# Patient Record
Sex: FEMALE | Race: BLACK OR AFRICAN AMERICAN | NOT HISPANIC OR LATINO | Employment: PART TIME | URBAN - METROPOLITAN AREA
[De-identification: names, ages, dates, MRNs, and addresses within clinical notes are randomized per-mention and may not be internally consistent; named-entity substitution may affect disease eponyms.]

---

## 2018-07-27 ENCOUNTER — HISTORICAL (OUTPATIENT)
Dept: ADMINISTRATIVE | Facility: HOSPITAL | Age: 28
End: 2018-07-27

## 2018-12-28 ENCOUNTER — HISTORICAL (OUTPATIENT)
Dept: ADMINISTRATIVE | Facility: HOSPITAL | Age: 28
End: 2018-12-28

## 2018-12-28 LAB
ABS NEUT (OLG): 7.95 X10(3)/MCL (ref 2.1–9.2)
BASOPHILS # BLD AUTO: 0 X10(3)/MCL (ref 0–0.2)
BASOPHILS NFR BLD AUTO: 0 %
EOSINOPHIL # BLD AUTO: 0.1 X10(3)/MCL (ref 0–0.9)
EOSINOPHIL NFR BLD AUTO: 1 %
ERYTHROCYTE [DISTWIDTH] IN BLOOD BY AUTOMATED COUNT: 14.3 % (ref 11.5–17)
GROUP & RH: NORMAL
HCT VFR BLD AUTO: 35.3 % (ref 37–47)
HGB BLD-MCNC: 11.1 GM/DL (ref 12–16)
LYMPHOCYTES # BLD AUTO: 2 X10(3)/MCL (ref 0.6–4.6)
LYMPHOCYTES NFR BLD AUTO: 18 %
MCH RBC QN AUTO: 27.1 PG (ref 27–31)
MCHC RBC AUTO-ENTMCNC: 31.4 GM/DL (ref 33–36)
MCV RBC AUTO: 86.1 FL (ref 80–94)
MONOCYTES # BLD AUTO: 0.9 X10(3)/MCL (ref 0.1–1.3)
MONOCYTES NFR BLD AUTO: 8 %
NEUTROPHILS # BLD AUTO: 7.95 X10(3)/MCL (ref 2.1–9.2)
NEUTROPHILS NFR BLD AUTO: 72 %
PLATELET # BLD AUTO: 253 X10(3)/MCL (ref 130–400)
PMV BLD AUTO: 9.8 FL (ref 9.4–12.4)
RBC # BLD AUTO: 4.1 X10(6)/MCL (ref 4.2–5.4)
T PALLIDUM AB SER QL: NORMAL
WBC # SPEC AUTO: 11.1 X10(3)/MCL (ref 4.5–11.5)

## 2022-04-30 NOTE — ED PROVIDER NOTES
Patient:   Adriana Hernandez            MRN: [id number]            FIN: [id number]               Age:   28 years     Sex:  Female     :  1990   Associated Diagnoses:   Abdominal pain in pregnancy   Author:   Zuleyma THOMASON, Jose MONTGOMERY      Basic Information   Time seen: Date & time 2018 19:17:00.   History source: Patient.   Arrival mode: Private vehicle.   History limitation: None.   Additional information: Patient's physician(s): Meme THOMASON, FELY Contreras have assummed care of this patient at  2200         . [facility]MD.      History of Present Illness   The patient presents with Abdominal pain without NVD, vaginal bleeding.  at 17 weeks gestation. She also reports having blurred vision today. Caitlyn NP    29 y/o AA female who is currently 17 weeks pregnant with hx of preeclampsia with second preg presents to the ED c/o bilateral abdominal pain that  began a few days ago and is intermittent mild tightness on both sides of the abdomen. Pt states that the pain worsened today, however she denies taking any medicine for the pain. Pt reports has some  headaches and denies dysuria. Pt states experiencing some bleeding when she was approximately 7 weeks pregnant however, she has not experienced any other complications with this pregnancy. .  The onset was Pt states pain began a few days ago.  The course/duration of symptoms is fluctuating in intensity.  The location is bilateral, Both sides of abdomen and Approximately level of the umbilicus along both sides.  The character of symptoms is achy and Tight.  The degree at onset was moderate.  The degree at present is moderate.  Radiating pain: none. Pregnancy Status : 4, Para: 3, 17 weeks Currently pregnant.  The exacerbating factor is none.  The relieving factor is none.  Risk factors consist of preeclampsia (prior).  Prior episodes: none.  Therapy today: none.  Pregnancy symptoms vaginal bleeding (at approximately 7 weeks).  Associated symptoms:  denies nausea, denies vomiting, denies fever, denies chills, denies dysuria, denies diarrhea, denies constipation, denies back pain and denies chest pain.        Review of Systems   Constitutional symptoms:  Negative except as documented in HPI.   Skin symptoms:  Negative except as documented in HPI.   Eye symptoms:  Negative except as documented in HPI.   ENMT symptoms:  Negative except as documented in HPI.   Respiratory symptoms:  Negative except as documented in HPI.   Cardiovascular symptoms:  Negative except as documented in HPI.   Gastrointestinal symptoms:  Abdominal pain, no nausea, no vomiting, no rectal bleeding.    Genitourinary symptoms:  Minimum bleeding at 7 weeks none since, no dysuria, no vaginal bleeding.    Musculoskeletal symptoms:  Negative except as documented in HPI.   Neurologic symptoms:  Headache.   Psychiatric symptoms:  Negative except as documented in HPI.   Endocrine symptoms:  Negative except as documented in HPI.   Hematologic/Lymphatic symptoms:  Negative except as documented in HPI.   Allergy/immunologic symptoms:  Negative except as documented in HPI.             Additional review of systems information: All other systems reviewed and otherwise negative.      Health Status   Allergies:    Allergic Reactions (Selected)  Severity Not Documented  Penicillins- No reactions were documented.  Sulfa drugs- No reactions were documented..   Medications: Prenatal vitamins.   Immunizations: Influenza, no tetanus up to date, no pneumococcal.   Pregnancy history: Currently pregnant, 17 weeks,  4, para 3.      Past Medical/ Family/ Social History   Medical history: preeclampsia, With 2nd pregnancy.   Surgical history: EGD.   Family history:    Asthma  Sister  Hypertension  Mother  .   Social history: Alcohol use: Denies, Tobacco use: Denies, Drug use: Denies, Occupation: Employed, Family/social situation: , intact family.      Physical Examination               Vital Signs       Vital Signs (last 24 hrs)_____  Last Charted___________  Temp Oral     37 DegC  (JUL 27 19:17)  Heart Rate Peripheral   77 bpm  (JUL 27 22:10)  Resp Rate         14 br/min  (JUL 27 22:10)  SBP      134 mmHg  (JUL 27 22:10)  DBP      79 mmHg  (JUL 27 22:10)  SpO2      100 %  (JUL 27 22:10)  .   Oxygen saturation.   General:  Alert, no acute distress, not anxious, not ill-appearing.    Skin:  Warm, dry, no pallor, no rash, normal for ethnicity.    Head:  Normocephalic, atraumatic.    Neck:  Supple, trachea midline, no tenderness, no carotid bruit.    Eye:  Pupils are equal, round and reactive to light, extraocular movements are intact, normal conjunctiva.    Ears, nose, mouth and throat:  Tympanic membranes clear, oral mucosa moist, no pharyngeal erythema or exudate.    Cardiovascular:  Regular rate and rhythm, No murmur, Normal peripheral perfusion, No edema.    Respiratory:  Lungs are clear to auscultation, respirations are non-labored, breath sounds are equal.    Chest wall:  No tenderness, No deformity.    Back:  Nontender, Normal range of motion, Normal alignment, no step-offs.    Musculoskeletal:  Normal ROM, normal strength, no tenderness, no swelling, no deformity.    Gastrointestinal:  Soft, Nontender, Non distended, Normal bowel sounds, No organomegaly.    Genitourinary:  no CVA tenderness.   Neurological:  Alert and oriented to person, place, time, and situation, No focal neurological deficit observed, CN II-XII intact, normal sensory observed, normal motor observed, normal speech observed, normal coordination observed, normal and symmetrical reflexes.    Lymphatics:  No lymphadenopathy.   Psychiatric:  Cooperative, appropriate mood & affect, normal judgment, non-suicidal.       Medical Decision Making   Differential Diagnosis:  Abdominal pain, discomfort of pregnancy.    Documents reviewed:  Emergency department nurses' notes.   Orders  Launch Orders   Laboratory:  CMP (Order): Stat collect, 7/27/2018  19:18 CDT, Blood, Lab Collect, Print Label By Order Location, 7/27/2018 19:18 CDT  CBC w/ Auto Diff (Order): Now collect, 7/27/2018 19:18 CDT, Blood, Lab Collect, Print Label By Order Location, 7/27/2018 19:18 CDT  Urinalysis Complete a reflex to culture (Order): Stat collect, Urine, 7/27/2018 19:18 CDT, Nurse collect, Print Label By Order Location  Patient Care:  obtain fetal heart tones (Order): 7/27/2018 19:18 CDT, obtain fetal heart tones.   Results review:  Lab results : Lab View   7/27/2018 19:27 CDT      Sodium Lvl                139 mmol/L                             Potassium Lvl             3.3 mmol/L  LOW                             Chloride                  108 mmol/L  HI                             CO2                       26.0 mmol/L                             Calcium Lvl               9.0 mg/dL                             Glucose Lvl               99 mg/dL                             BUN                       7.0 mg/dL                             Creatinine                0.64 mg/dL                             eGFR-AA                   >60 mL/min/1.73 m2  NA                             eGFR-PARMINDER                  >60 mL/min/1.73 m2  NA                             Bili Total                <0.1 mg/dL  LOW                             Bili Direct               0.00 mg/dL                             Bili Indirect             <0.10 mg/dL                             AST                       17 unit/L                             ALT                       30 unit/L                             Alk Phos                  85 unit/L                             Total Protein             7.1 gm/dL                             Albumin Lvl               3.30 gm/dL  LOW                             Globulin                  3.80 gm/dL  HI                             A/G Ratio                 0.9 ratio  LOW                             WBC                       10.0 x10(3)/mcL                             RBC                        3.80 x10(6)/mcL  LOW                             Hgb                       10.9 gm/dL  LOW                             Hct                       33.2 %  LOW                             Platelet                  239 x10(3)/mcL                             MCV                       87.4 fL                             MCH                       28.7 pg                             MCHC                      32.8 gm/dL  LOW                             RDW                       12.8 %                             MPV                       9.9 fL                             Abs Neut                  7.52 x10(3)/mcL                             Neutro Auto               75 %  NA                             Lymph Auto                18 %  NA                             Mono Auto                 6 %  NA                             Eos Auto                  1 %  NA                             Abs Eos                   0.1 x10(3)/mcL                             Basophil Auto             0 %  NA                             Abs Neutro                7.52 x10(3)/mcL                             Abs Lymph                 1.8 x10(3)/mcL                             Abs Mono                  0.6 x10(3)/mcL                             Abs Baso                  0.0 x10(3)/mcL    7/27/2018 19:26 CDT      UA Appear                 CLEAR                             UA Color                  YELLOW                             UA Spec Grav              1.032                             UA Bili                   Negative                             UA pH                     6.0                             UA Urobilinogen           1.0                             UA Blood                  2+                             UA Glucose                Negative                             UA Ketones                Negative                             UA Protein                Negative                             UA Nitrite                Negative                              UA Leuk Est               Trace                             UA WBC                    NONE SEEN                             UA RBC                    20 /HPF  HI                             UA Bacteria               NONE SEEN /HPF                             UA Squam Epithelial       NONE SEEN  .   Radiology results:  Ultrasound, Unremarkable ultrasound this point.  Heart rate 147 cervix 7.29 cm.  Vertex presentation fundal placenta estimated date of delivery1/2/ 2019.       Reexamination/ Reevaluation   Time: 7/27/2018 22:15:00 .   Assessment: Normal exam nontender abdomen bilateral pain consistent with round ligament her discomfort a pregnancy stable doing well will be discharged home take Tylenol.      Impression and Plan   Diagnosis   Abdominal pain in pregnancy ([id number] O26.899)      Calls-Consults   -  7/27/2018 22:16:00 .   Plan   Condition: Unchanged.    Disposition: Discharged: Time  7/27/2018 22:16:00, to home.    Patient was given the following educational materials: Abdominal Pain During Pregnancy, Easy-to-Read.    Follow up with: Srinivasa Valentine Call for followup appointment please follow-up with your ObGyn doctor this next week  He may take Tylenol for any discomfort  Return for any emergency problem.    Counseled: Patient, Regarding diagnosis, Regarding diagnostic results, Regarding treatment plan, Patient understood.    Notes: I, Taylor Jeansonne, acted solely as a scribe for and in the presence of Dr. Amor who performed the service., I, Jose Amor MD, a physician licensed to practice in this state, have  performed the physical evaluation,  history gathering,  and medical decision making that is reflected in this record..   RUEL Amor MD.

## 2023-03-01 ENCOUNTER — HOSPITAL ENCOUNTER (OUTPATIENT)
Dept: RADIOLOGY | Facility: HOSPITAL | Age: 33
Discharge: HOME | End: 2023-03-01
Attending: NURSE PRACTITIONER
Payer: COMMERCIAL

## 2023-03-01 DIAGNOSIS — J45.30 MILD PERSISTENT ASTHMA: Primary | ICD-10-CM

## 2023-03-01 DIAGNOSIS — J45.30 MILD PERSISTENT ASTHMA: ICD-10-CM

## 2023-03-01 PROCEDURE — 71046 X-RAY EXAM CHEST 2 VIEWS: CPT | Mod: TC

## 2023-04-11 ENCOUNTER — HOSPITAL ENCOUNTER (OUTPATIENT)
Dept: RADIOLOGY | Facility: HOSPITAL | Age: 33
Discharge: HOME | End: 2023-04-11
Attending: NURSE PRACTITIONER
Payer: MEDICAID

## 2023-04-11 DIAGNOSIS — Z15.01 GENETIC PREDISPOSITION TO BREAST CANCER: Primary | ICD-10-CM

## 2023-04-11 DIAGNOSIS — N63.11 UNSPECIFIED LUMP IN THE RIGHT BREAST, UPPER OUTER QUADRANT: ICD-10-CM

## 2023-04-11 PROCEDURE — 76642 US BREAST RIGHT LIMITED: ICD-10-PCS | Mod: 26,RT,, | Performed by: RADIOLOGY

## 2023-04-11 PROCEDURE — 76642 ULTRASOUND BREAST LIMITED: CPT | Mod: TC,RT

## 2023-04-11 PROCEDURE — 77066 DX MAMMO INCL CAD BI: CPT | Mod: TC

## 2023-04-11 PROCEDURE — 77062 MAMMO DIGITAL DIAGNOSTIC BILAT WITH TOMO: ICD-10-PCS | Mod: 26,,, | Performed by: RADIOLOGY

## 2023-04-11 PROCEDURE — 77062 BREAST TOMOSYNTHESIS BI: CPT | Mod: 26,,, | Performed by: RADIOLOGY

## 2023-04-11 PROCEDURE — 77066 DX MAMMO INCL CAD BI: CPT | Mod: 26,,, | Performed by: RADIOLOGY

## 2023-04-11 PROCEDURE — 77066 MAMMO DIGITAL DIAGNOSTIC BILAT WITH TOMO: ICD-10-PCS | Mod: 26,,, | Performed by: RADIOLOGY

## 2023-04-11 PROCEDURE — 76642 ULTRASOUND BREAST LIMITED: CPT | Mod: 26,RT,, | Performed by: RADIOLOGY

## 2023-10-24 NOTE — PROGRESS NOTES
[facility] Breast Surg  Breast Surgical Oncology  New Patient Office Visit - H&P      Care Team: Patient Care Team:  Yasmin Coreas MD as PCP - General (Family Medicine)   Referring Provider: No ref. provider found    OBGYN: Mine Perez NP    Chief Complaint:   Chief Complaint   Patient presents with    Breast Cancer Screening     New patient is present for High Risk visit.         Subjective:      History of Present Illness:  Adriana Hernandez is a pleasant 33 y.o.female who presents as a referral from Mine Perez NP for high risk evaluation.  Family history is significant for mom with breast cancer at 42, maternal aunt with breast cancer in her 40s, maternal grandmother with breast cancer in her 40s who  of metastatic disease, and maternal grandfather with metastatic prostate cancer.  She denies any breast changes including new masses, skin changes, skin lesions, pain, nipple discharge or retraction.  She had a bilateral diagnostic mammogram in April for a physician palpated mass in her right breast.  There were no suspicious abnormalities seen.  She has never had any breast surgeries or biopsies in the past.  She does not smoke. 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 She has four children, 3 boys and 1 girl, and is interested in more children.      Imaging:   Bilateral diagnostic mammogram and ultrasound 2023:  Density D. no suspicious findings.  And no abnormalities evident in general area of palpable concern in the R upper-outer quadrant.  BI-RADS 2.    I have reviewed the imaging and agree with the radiologists interpretation. I have discussed these results with the patient.    Pathology:  None      OB / GYN History   Menarche Onset:9  Menopause: Menopause: premenopausal  Hormonal birth control (duration): 2years  Pregnancies: 4  Age at first child birth: 15  Child births: 4  Hysterectomy/Oophorectomy: no  HRT: no    Family History of  Cancer (& age at diagnosis):  -   Family History   Problem Relation Age of Onset    Breast cancer Mother 42    Diabetes Mother     Hypertension Mother     Breast cancer Maternal Grandmother 40 - 49    Hypertension Maternal Grandmother     Prostate cancer Maternal Grandfather 50 - 59    Breast cancer Maternal Aunt 42    Hypertension Maternal Aunt     Diabetes Maternal Cousin         Lifestyle:  Height and Weight:   BMI: Body mass index is 23.46 kg/m².     Other:  MG breast density: D  Prior thoracic RT: none  Genetic testing: No  Ashkenazi Buddhism descent: No    Other History:     History reviewed. No pertinent past medical history.     History reviewed. No pertinent surgical history.     Social History     Socioeconomic History    Marital status:    Tobacco Use    Smoking status: Never    Smokeless tobacco: Never   Substance and Sexual Activity    Alcohol use: Not Currently     Comment: occasionally    Drug use: Never    Sexual activity: Yes     Partners: Male          There is no immunization history on file for this patient.     Medications/Allergies:       Current Outpatient Medications   Medication Instructions    albuterol (PROVENTIL/VENTOLIN HFA) 90 mcg/actuation inhaler As needed (PRN)    fluticasone propionate (FLOVENT HFA) 220 mcg/actuation inhaler As needed (PRN)    levocetirizine (XYZAL) 5 MG tablet 1 tablet in the evening Orally Once a day    loratadine (CLARITIN) 10 mg tablet As needed (PRN)    montelukast (SINGULAIR) 10 mg tablet As needed (PRN)    multivit-iron-FA-calcium-mins (WOMEN'S DAILY FORMULA) 18 mg iron-400 mcg-500 mg Ca Tab Womens Daily Formula    omeprazole (PRILOSEC) 40 MG capsule 1 capsule 30 minutes before morning meal Orally Once a day       Review of patient's allergies indicates:   Allergen Reactions    Penicillins Hives    Sulfa (sulfonamide antibiotics) Hives        Review of Systems:      ROS    Constitutional: denies fevers, chills, weight loss  HEENT: denies blurry/double  "vision, changes in hearing, odynophagia, dysphagia  Respiratory: denies cough, shortness of breath  Cardiovascular: denies palpitations, swelling of the extremities  GI: denies abdominal pain, nausea/vomiting, hematochezia, frequent stools  : denies frequency, dysuria, flank pain, hematuria  Skin: denies new rashes  Neurological: denies muscular/sensory deficiencies, loss of coordination, memory changes, positive migraines  Endo: denies hair loss/thinning, nervousness, hot flashes, heat/cold intolerance, lumps in the neck area  Heme: denies easy bruising and fatigue  Psychological: denies anxious/depressive moods  Musculoskeletal: denies bony pain, muscle cramps, swollen joints       Objective/Physical Exam   Visit Vitals  /71   Pulse 82   Temp 98.4 °F (36.9 °C) (Oral)   Resp 18   Ht 5' 5" (1.651 m)   Wt 64 kg (141 lb)   LMP 09/17/2023   SpO2 99%   BMI 23.46 kg/m²        Physical Exam    General: The patient is awake, alert and oriented. The patient is well nourished. No acute distress.  Neck: The neck is supple. The thyroid is not enlarged.  Musculoskeletal: The patient has a normal range of motion of her bilateral upper extremities.  Heart: Regular rate and rhythm, no murmurs.   Lung: No increased work of breathing. Clear breath sounds bilaterally.  Lymph nodes: There is no axillary, supraclavicular or cervical lymphadenopathy.  Breast:  Right:   On inspection there is no skin dimpling, rashes, retraction, erythema or skin abnormalities. The nipple areolar complex is normal with an everted nipple. The breasts move normally with movement of the pectoralis muscle. On palpation there are no dominant masses. There is no tenderness. There is no nipple discharge.  Left:   On inspection there is no skin dimpling, rashes, retraction, erythema or skin abnormalities. The nipple areolar complex is normal with an everted nipple. The breasts move normally with movement of the pectoralis muscle. On palpation there are " no dominant masses.  There is no tenderness. There is no nipple discharge.     Assessment and Plan     1. Visit for screening mammogram  - Mammo Digital Screening Bilat w/ Moe; Future    2. Screening for breast cancer using non-mammogram modality  - MRI Breast w/wo Contrast, w/CAD, Bilateral; Future    3. At high risk for breast cancer  - MRI Breast w/wo Contrast, w/CAD, Bilateral; Future    Adriana Hernandez is a pleasant 33 y.o.female who presents today for high risk evaluation.  No suspicious findings on breast exam today.  Today she was plugged into the [employer] risk calculator and her calculated risk of breast cancer based on Tyrer - Cuzick Breast Cancer Risk Model  was 35%.  [place] 5 year risk is 0.5%.  She understands that >20% is considered high risk.  Today we discussed risk factors associated with the development of breast cancer and risk reduction strategies.      After the initial clinical evaluation nearly 30 minutes were on counseling the patients regarding the options for management. Risk reduction strategies were discussed.     1. Lifestyle factors: As with other types of cancer, studies continue to show that various lifestyle factors may contribute to the development of breast cancer.     Weight: Recent studies have shown that postmenopausal women who are overweight or obese have an increased risk of breast cancer. These women also have a higher risk of having the cancer come back after treatment.     Physical activity: Decreased physical activity is associated with an increased risk of developing breast cancer and a higher risk of having the cancer come back after treatment. Regular physical activity may protect against breast cancer by helping women maintain a healthy body weight, lowering hormone levels, or causing changes in a womens metabolism or immune factors.     Alcohol: Current research suggests that having more than 1 to 2 alcoholic drinks, including beer, wine, and  "spirits, per day raises the risk of breast cancer, as well as the risk of having the cancer come back after treatment.     Food: There is no reliable research that confirms that eating or avoiding specific foods reduces the risk of developing breast cancer or having the cancer come back after treatment. However, eating more fruits and vegetables and fewer animal fats is linked with many health benefits.     2. Prevention:  Surgery to lower cancer risk: For women with BRCA1 or BRCA2 genetic mutations, which substantially increase the risk of breast cancer, preventive removal of the breasts may be considered. The procedure, called a prophylactic mastectomy, appears to reduce the risk of developing breast cancer by at least 95%. Women with these mutations should also consider the preventive removal of the ovaries and fallopian tubes, called a prophylactic salpingo-oophorectomy. This procedure can reduce the risk of developing ovarian cancer, as well as breast cancer, by stopping the ovaries from making estrogen.      Drugs to lower cancer risk (Chemoprevention): Women who have a higher than usual risk of developing breast cancer may consider certain drugs that may help prevent breast cancer. This approach may also be called "chemoprevention." For breast cancer, this is the use of hormone-blocking drugs to reduce cancer risk. The drugs, tamoxifen (Soltamox) and raloxifene (Evista), are approved by the U.S. Food and Drug Administration (FDA) to lower breast cancer risk. These drugs are called selective estrogen receptor modulators (SERMs) and are not chemotherapy. A SERM is a medication that blocks estrogen receptors in some tissues and not others. Both women who have gone through menopause and those who have not may take tamoxifen. Raloxifene is only approved for women who have gone through menopause. Each drug also has different side effects.     Aromatase inhibitors (AIs) have also been shown to lower breast cancer " risk. AIs are a type of hormone-blocking treatment that reduces the amount of estrogen in a woman's body by stopping tissues and organs other than the ovaries from producing estrogen. They can only be used by women who have gone through menopause. However, no AIs have been approved by the FDA for lowering breast cancer risk in women who do not have the disease.     3. Surveillance: Women with a known genetic mutation should follow screening guidelines per the NCCN guidelines. Women with no known genetic mutation  and found to be at greater than 20 percent average lifetime risk of breast cancer are recommended for the following screening recommendations:     Clinical Breast exam: Every 6-12 months starting at age found to be at increased risk by risk model     Mammogram: Per NCCN guidelines, recommended every year starting 10 years younger than the youngest breast cancer case in the family (but not before age 30). May consider beginning breast MRIs at age 30 per ACR guidelines if desired by patient or other clinical considerations. May also consider getting a baseline MG at time of initial high risk consultation.     Breast MRI: Per NCCN guidelines, recommended every year starting 10 years younger than the youngest breast cancer case in the family (but not before age 25). May consider beginning breast MRIs at age 30 per ACR guidelines if desired by patient or other clinical considerations. May also consider getting a baseline MG at time of initial high risk consultation. If patient has a first-degree relative with a BRCA1/2 gene mutation, youre encouraged to get genetic counseling and/or testing before getting MRI as part of screening (for those who do not wish to have genetic testing, MRI is recommended). Breast MRI in combination with mammography is better than mammography alone at finding breast cancer in certain women at higher than average risk.      --------------------------------------------------------------------------------------------------------------    PLAN:    1. Lifestyle - Healthy lifestyle guidelines were reviewed. She was encouraged to engage in regular exercise, maintain a healthy body weight, and avoid excessive alcohol consumption. Healthy nutritional guidelines were also discussed.     2. Surveillance - She desires undergoing high risk screening with annual screening mammograms and breast MRIs. In the absence of significant clinical findings in the interval, I recommend MRI now and bilateral screening mammogram due in April 2024.    3. Prevention - We had a brief discussion/education about indications for preventative mastectomy or chemoprevention.  These methods are not recommended to her at this time.    4. Genetics - According to NCCN guidelines,  she meets criteria for genetic testing.  We discussed testing and will proceed with lab draw in the office today.    5. Recommend 2 clinical breast exams a year and this can alternate between the breast Center and her OBGYN.    We also recommend and discussed performing a self breast exam monthly.  If she notices any changes in her breast exam in between visits she was encouraged to call the breast center to be seen as soon as possible. All questions were answered.         Sheryl Shelton MD  Breast Surgical Oncology       I spent a total of 45 minutes on the day of the visit.  This includes face to face time and non-face to face time preparing to see the patient (eg, review of tests), obtaining and/or reviewing separately obtained history, documenting clinical information in the electronic or other health record, independently interpreting results and communicating results to the patient/family/caregiver, or care coordinator.

## 2023-10-25 ENCOUNTER — OFFICE VISIT (OUTPATIENT)
Dept: SURGERY | Facility: CLINIC | Age: 33
End: 2023-10-25
Payer: MEDICAID

## 2023-10-25 VITALS
TEMPERATURE: 98 F | HEIGHT: 65 IN | HEART RATE: 82 BPM | RESPIRATION RATE: 18 BRPM | DIASTOLIC BLOOD PRESSURE: 71 MMHG | WEIGHT: 141 LBS | SYSTOLIC BLOOD PRESSURE: 116 MMHG | BODY MASS INDEX: 23.49 KG/M2 | OXYGEN SATURATION: 99 %

## 2023-10-25 DIAGNOSIS — Z12.39 SCREENING FOR BREAST CANCER USING NON-MAMMOGRAM MODALITY: ICD-10-CM

## 2023-10-25 DIAGNOSIS — Z91.89 AT HIGH RISK FOR BREAST CANCER: ICD-10-CM

## 2023-10-25 DIAGNOSIS — Z12.31 VISIT FOR SCREENING MAMMOGRAM: Primary | ICD-10-CM

## 2023-10-25 PROCEDURE — 3078F DIAST BP <80 MM HG: CPT | Mod: CPTII,,, | Performed by: STUDENT IN AN ORGANIZED HEALTH CARE EDUCATION/TRAINING PROGRAM

## 2023-10-25 PROCEDURE — 99999 PR PBB SHADOW E&M-EST. PATIENT-LVL IV: ICD-10-PCS | Mod: PBBFAC,,, | Performed by: STUDENT IN AN ORGANIZED HEALTH CARE EDUCATION/TRAINING PROGRAM

## 2023-10-25 PROCEDURE — 99204 PR OFFICE/OUTPT VISIT, NEW, LEVL IV, 45-59 MIN: ICD-10-PCS | Mod: S$PBB,,, | Performed by: STUDENT IN AN ORGANIZED HEALTH CARE EDUCATION/TRAINING PROGRAM

## 2023-10-25 PROCEDURE — 3078F PR MOST RECENT DIASTOLIC BLOOD PRESSURE < 80 MM HG: ICD-10-PCS | Mod: CPTII,,, | Performed by: STUDENT IN AN ORGANIZED HEALTH CARE EDUCATION/TRAINING PROGRAM

## 2023-10-25 PROCEDURE — 3008F PR BODY MASS INDEX (BMI) DOCUMENTED: ICD-10-PCS | Mod: CPTII,,, | Performed by: STUDENT IN AN ORGANIZED HEALTH CARE EDUCATION/TRAINING PROGRAM

## 2023-10-25 PROCEDURE — 3074F PR MOST RECENT SYSTOLIC BLOOD PRESSURE < 130 MM HG: ICD-10-PCS | Mod: CPTII,,, | Performed by: STUDENT IN AN ORGANIZED HEALTH CARE EDUCATION/TRAINING PROGRAM

## 2023-10-25 PROCEDURE — 99214 OFFICE O/P EST MOD 30 MIN: CPT | Mod: PBBFAC | Performed by: STUDENT IN AN ORGANIZED HEALTH CARE EDUCATION/TRAINING PROGRAM

## 2023-10-25 PROCEDURE — 99999 PR PBB SHADOW E&M-EST. PATIENT-LVL IV: CPT | Mod: PBBFAC,,, | Performed by: STUDENT IN AN ORGANIZED HEALTH CARE EDUCATION/TRAINING PROGRAM

## 2023-10-25 PROCEDURE — 1159F PR MEDICATION LIST DOCUMENTED IN MEDICAL RECORD: ICD-10-PCS | Mod: CPTII,,, | Performed by: STUDENT IN AN ORGANIZED HEALTH CARE EDUCATION/TRAINING PROGRAM

## 2023-10-25 PROCEDURE — 3074F SYST BP LT 130 MM HG: CPT | Mod: CPTII,,, | Performed by: STUDENT IN AN ORGANIZED HEALTH CARE EDUCATION/TRAINING PROGRAM

## 2023-10-25 PROCEDURE — 3008F BODY MASS INDEX DOCD: CPT | Mod: CPTII,,, | Performed by: STUDENT IN AN ORGANIZED HEALTH CARE EDUCATION/TRAINING PROGRAM

## 2023-10-25 PROCEDURE — 1159F MED LIST DOCD IN RCRD: CPT | Mod: CPTII,,, | Performed by: STUDENT IN AN ORGANIZED HEALTH CARE EDUCATION/TRAINING PROGRAM

## 2023-10-25 PROCEDURE — 1160F RVW MEDS BY RX/DR IN RCRD: CPT | Mod: CPTII,,, | Performed by: STUDENT IN AN ORGANIZED HEALTH CARE EDUCATION/TRAINING PROGRAM

## 2023-10-25 PROCEDURE — 99204 OFFICE O/P NEW MOD 45 MIN: CPT | Mod: S$PBB,,, | Performed by: STUDENT IN AN ORGANIZED HEALTH CARE EDUCATION/TRAINING PROGRAM

## 2023-10-25 PROCEDURE — 1160F PR REVIEW ALL MEDS BY PRESCRIBER/CLIN PHARMACIST DOCUMENTED: ICD-10-PCS | Mod: CPTII,,, | Performed by: STUDENT IN AN ORGANIZED HEALTH CARE EDUCATION/TRAINING PROGRAM

## 2023-11-07 ENCOUNTER — TELEPHONE (OUTPATIENT)
Dept: SURGERY | Facility: CLINIC | Age: 33
End: 2023-11-07
Payer: MEDICAID

## 2023-11-07 NOTE — TELEPHONE ENCOUNTER
[facility] genetic rest results called to patient.  Explained that results negative for a genetic mutation with one VUS. Explained to patient that genetic counseling is available through [facility] if she desires. Patient verbalized understanding. Results scanned to chart and printed for patient. Patient will come to the office to  results at her convenience.

## 2024-03-23 ENCOUNTER — HOSPITAL ENCOUNTER (EMERGENCY)
Facility: HOSPITAL | Age: 34
Discharge: HOME | End: 2024-03-23
Attending: EMERGENCY MEDICINE
Payer: MEDICAID

## 2024-03-23 VITALS
TEMPERATURE: 98 F | HEART RATE: 92 BPM | RESPIRATION RATE: 18 BRPM | DIASTOLIC BLOOD PRESSURE: 70 MMHG | OXYGEN SATURATION: 100 % | SYSTOLIC BLOOD PRESSURE: 130 MMHG

## 2024-03-23 DIAGNOSIS — N75.1 BARTHOLIN'S GLAND ABSCESS: Primary | ICD-10-CM

## 2024-03-23 PROCEDURE — 99284 EMERGENCY DEPT VISIT MOD MDM: CPT | Mod: 25

## 2024-03-23 PROCEDURE — 56420 I&D BARTHOLINS GLAND ABSCESS: CPT

## 2024-03-23 PROCEDURE — 25000003 PHARM REV CODE 250

## 2024-03-23 RX ORDER — IBUPROFEN 600 MG/1
600 TABLET ORAL EVERY 6 HOURS PRN
Qty: 20 TABLET | Refills: 0 | Status: SHIPPED | OUTPATIENT
Start: 2024-03-23

## 2024-03-23 RX ORDER — CLINDAMYCIN HYDROCHLORIDE 300 MG/1
300 CAPSULE ORAL EVERY 8 HOURS
Qty: 21 CAPSULE | Refills: 0 | Status: SHIPPED | OUTPATIENT
Start: 2024-03-23 | End: 2024-03-30

## 2024-03-23 RX ORDER — ONDANSETRON 4 MG/1
4 TABLET, ORALLY DISINTEGRATING ORAL
Status: COMPLETED | OUTPATIENT
Start: 2024-03-23 | End: 2024-03-23

## 2024-03-23 RX ORDER — LIDOCAINE HYDROCHLORIDE 10 MG/ML
5 INJECTION INFILTRATION; PERINEURAL
Status: COMPLETED | OUTPATIENT
Start: 2024-03-23 | End: 2024-03-23

## 2024-03-23 RX ORDER — HYDROCODONE BITARTRATE AND ACETAMINOPHEN 5; 325 MG/1; MG/1
1 TABLET ORAL EVERY 6 HOURS PRN
Qty: 8 TABLET | Refills: 0 | Status: SHIPPED | OUTPATIENT
Start: 2024-03-23

## 2024-03-23 RX ORDER — ONDANSETRON 4 MG/1
4 TABLET, ORALLY DISINTEGRATING ORAL
Status: DISCONTINUED | OUTPATIENT
Start: 2024-03-23 | End: 2024-03-23

## 2024-03-23 RX ORDER — HYDROCODONE BITARTRATE AND ACETAMINOPHEN 7.5; 325 MG/1; MG/1
1 TABLET ORAL
Status: COMPLETED | OUTPATIENT
Start: 2024-03-23 | End: 2024-03-23

## 2024-03-23 RX ADMIN — HYDROCODONE BITARTRATE AND ACETAMINOPHEN 1 TABLET: 7.5; 325 TABLET ORAL at 05:03

## 2024-03-23 RX ADMIN — ONDANSETRON 4 MG: 4 TABLET, ORALLY DISINTEGRATING ORAL at 05:03

## 2024-03-23 RX ADMIN — LIDOCAINE HYDROCHLORIDE 5 ML: 10 INJECTION, SOLUTION INFILTRATION; PERINEURAL at 05:03

## 2024-03-23 NOTE — FIRST PROVIDER EVALUATION
Medical screening examination initiated.  I have conducted a focused provider triage encounter, findings are as follows:    Brief history of present illness:  33y/o F presents to the ED with concerns of having a abscess in her vagina. Onset 1 week.     There were no vitals filed for this visit.    Pertinent physical exam:  AAA x 3    Brief workup plan:  MD evaluation.     Preliminary workup initiated; this workup will be continued and followed by the physician or advanced practice provider that is assigned to the patient when roomed.

## 2024-03-23 NOTE — DISCHARGE INSTRUCTIONS
Thanks for letting us take care of you today!  It is our goal to give you courteous care and to keep you comfortable and informed, if you have any questions before you leave I will be happy to try and answer them.    Here is some advice after your visit:      Your visit in the emergency department is NOT definitive care - please follow-up with your primary care doctor and/or specialist within 1 week.  Please return if you have any worsening in your condition or if you have any other concerns.    If you were prescribed OPIATE PAIN MEDICATION - please understand of these medications can be addictive, you may fill less of the prescription was written for, you do not have to take the full prescription.  You may discard what you do not use.  Please seek help if you feel you are having problems with addiction.  Do not drive or operate heavy machinery if you are taking sedating medications.  Do not mix these medications with alcohol.      Please take the full course of  any ANTIBIOTICS you were prescribed - incomplete courses of antibiotics can cause resistance to antibiotics in the future which will make it difficult to treat any infections you may have.

## 2024-03-23 NOTE — ED PROVIDER NOTES
Encounter Date: 3/23/2024       History     Chief Complaint   Patient presents with    Abscess     Pt. C/o vaginal abscess started x1 week ago.. denies drainage or fever     34 y.o.  female presents to Emergency Department with a chief complaint of vaginal abscess. Symptoms began 1 week ago after shaving and have been constant since onset. Associated symptoms include redness, swelling, and tenderness to vagina. Symptoms are aggravated with palpation and there are no alleviating factors. The patient denies CP, SOB, fever, chills, dizziness, or vomiting. No other reported symptoms at this time      The history is provided by the patient. No  was used.   Abscess   This is a new problem. The current episode started several days ago. The problem occurs continuously. The problem has been unchanged. The abscess is present on the groin. The abscess is characterized by redness, painfulness and swelling. Pertinent negatives include no anorexia, no decrease in physical activity, not sleeping less, no fever, no diarrhea, no vomiting, no congestion, no rhinorrhea, no decreased responsiveness and no cough. She has received no recent medical care.     Review of patient's allergies indicates:   Allergen Reactions    Penicillins Hives    Sulfa (sulfonamide antibiotics) Hives     History reviewed. No pertinent past medical history.  History reviewed. No pertinent surgical history.  Family History   Problem Relation Age of Onset    Breast cancer Mother 42    Diabetes Mother     Hypertension Mother     Breast cancer Maternal Grandmother 40 - 49    Hypertension Maternal Grandmother     Prostate cancer Maternal Grandfather 50 - 59    Breast cancer Maternal Aunt 42    Hypertension Maternal Aunt     Diabetes Maternal Cousin      Social History     Tobacco Use    Smoking status: Never    Smokeless tobacco: Never   Substance Use Topics    Alcohol use: Not Currently     Comment: occasionally    Drug use:  Never     Review of Systems   Constitutional:  Negative for chills, decreased responsiveness, fatigue and fever.   HENT:  Negative for congestion, rhinorrhea, trouble swallowing and voice change.    Eyes:  Negative for photophobia and visual disturbance.   Respiratory:  Negative for cough, shortness of breath, wheezing and stridor.    Cardiovascular:  Negative for chest pain, palpitations and leg swelling.   Gastrointestinal:  Negative for anorexia, diarrhea, nausea and vomiting.   Skin:  Positive for color change.   All other systems reviewed and are negative.      Physical Exam     Initial Vitals [03/23/24 1515]   BP Pulse Resp Temp SpO2   132/73 91 18 98.2 °F (36.8 °C) 100 %      MAP       --         Physical Exam    Nursing note and vitals reviewed.  Constitutional: She appears well-developed and well-nourished. She is not diaphoretic. No distress.   Cardiovascular:  Normal rate, regular rhythm, S1 normal, S2 normal, normal heart sounds, intact distal pulses and normal pulses.           Pulmonary/Chest: Effort normal and breath sounds normal. No tachypnea and no bradypnea. No respiratory distress. She has no decreased breath sounds. She has no wheezes. She has no rhonchi. She has no rales. She exhibits no tenderness.   Abdominal: Abdomen is soft. Bowel sounds are normal. She exhibits no distension. There is no abdominal tenderness. There is no rebound.   Genitourinary:    Genitourinary Comments: Tender, red, lump protruding from vaginal opening. No drainage. Exam performed with SANDY Shultz as chaperone.      Musculoskeletal:         General: Normal range of motion.     Neurological: She is alert and oriented to person, place, and time. She has normal strength. No sensory deficit. GCS score is 15. GCS eye subscore is 4. GCS verbal subscore is 5. GCS motor subscore is 6.   Skin: Skin is warm and dry. Capillary refill takes less than 2 seconds. Abscess noted.   Psychiatric: She has a normal mood and affect. Thought  content normal.         ED Course   I & D - Incision and Drainage    Date/Time: 3/23/2024 5:48 PM  Location procedure was performed: [facility] EMERGENCY DEPARTMENT    Performed by: Sandhya Evans NP  Authorized by: Sandhya Evans NP  Consent Done: Yes  Consent: Verbal consent obtained.  Risks and benefits: risks, benefits and alternatives were discussed  Consent given by: patient  Patient understanding: patient states understanding of the procedure being performed  Type: abscess  Body area: anogenital  Location details: Bartholin's gland  Anesthesia: local infiltration    Anesthesia:  Local Anesthetic: lidocaine 1% without epinephrine  Anesthetic total: 5 mL  Scalpel size: 11  Incision type: single straight  Drainage: pus and serosanguinous  Drainage amount: moderate  Wound treatment: incision, drainage and wound left open  Packing material: none  Complications: No  Patient tolerance: Patient tolerated the procedure well with no immediate complications        Labs Reviewed - No data to display       Imaging Results    None          Medications   HYDROcodone-acetaminophen 7.5-325 mg per tablet 1 tablet (1 tablet Oral Given 3/23/24 1717)   LIDOcaine HCL 10 mg/ml (1%) injection 5 mL (5 mLs Infiltration Given 3/23/24 1728)   ondansetron disintegrating tablet 4 mg (4 mg Oral Given 3/23/24 1727)     Medical Decision Making  Patient awake, alert, has non-labored breathing, and follows commands appropriately. Arrived to ED abscess. Symptoms 1 week ago after shaving. Afebrile. NAD Noted.         Differential Diagnosis: Abscess, Bartholin Cyst, Cellulitis     Amount and/or Complexity of Data Reviewed  Discussion of management or test interpretation with external provider(s): Patient reports improvement of symptoms after I & D; tolerated well. Will place patient on abx and instructed to f/u with OBGYN. Discussed plan of care and interventions with patient. Agreed to and aware of plan of care. Comfortable being discharged  home. Patient discharged home. Patient denies new or additional complaints; no further tests indicated at this time. Verbalized understanding of instructions. No emergent or apparent distress noted prior to discharge. To follow up with PCP in 1 week as needed. Strict ER return precautions given.       Risk  OTC drugs.  Prescription drug management.                                      Clinical Impression:  Final diagnoses:  [N75.1] Bartholin's gland abscess (Primary)          ED Disposition Condition    Discharge Stable          ED Prescriptions       Medication Sig Dispense Start Date End Date Auth. Provider    HYDROcodone-acetaminophen (NORCO) 5-325 mg per tablet Take 1 tablet by mouth every 6 (six) hours as needed for Pain. 8 tablet 3/23/2024 -- Sandhya Evans, NP    clindamycin (CLEOCIN) 300 MG capsule Take 1 capsule (300 mg total) by mouth every 8 (eight) hours. for 7 days 21 capsule 3/23/2024 3/30/2024 Sandhya Evans NP    ibuprofen (ADVIL,MOTRIN) 600 MG tablet Take 1 tablet (600 mg total) by mouth every 6 (six) hours as needed for Pain. 20 tablet 3/23/2024 -- Sandhya Evans NP          Follow-up Information       Follow up With Specialties Details Why Contact Info    Yasmin Coreas MD Family Medicine Call in 1 week If symptoms worsen, As needed [address] [facility] [id number] [phone]      [facility] Emergency Dept Emergency Medicine Go to  If symptoms worsen, As needed [address] [facility] [id number] [phone]             Sandhya Evans NP  03/23/24 6630

## 2024-08-22 ENCOUNTER — HOSPITAL ENCOUNTER (OUTPATIENT)
Dept: RADIOLOGY | Facility: HOSPITAL | Age: 34
Discharge: HOME | End: 2024-08-22
Attending: NURSE PRACTITIONER
Payer: MEDICAID

## 2024-08-22 DIAGNOSIS — Z12.39 OTHER SCREENING BREAST EXAMINATION: ICD-10-CM

## 2024-08-22 PROCEDURE — 77063 BREAST TOMOSYNTHESIS BI: CPT | Mod: 26,,, | Performed by: RADIOLOGY

## 2024-08-22 PROCEDURE — 77067 SCR MAMMO BI INCL CAD: CPT | Mod: TC

## 2024-08-22 PROCEDURE — 77067 SCR MAMMO BI INCL CAD: CPT | Mod: 26,,, | Performed by: RADIOLOGY

## 2024-11-11 ENCOUNTER — OFFICE VISIT (OUTPATIENT)
Dept: SURGERY | Facility: CLINIC | Age: 34
End: 2024-11-11
Payer: MEDICAID

## 2024-11-11 VITALS
SYSTOLIC BLOOD PRESSURE: 117 MMHG | OXYGEN SATURATION: 100 % | TEMPERATURE: 98 F | HEIGHT: 65 IN | DIASTOLIC BLOOD PRESSURE: 75 MMHG | WEIGHT: 136.38 LBS | BODY MASS INDEX: 22.72 KG/M2 | RESPIRATION RATE: 18 BRPM | HEART RATE: 71 BPM

## 2024-11-11 DIAGNOSIS — Z12.39 ENCOUNTER FOR BREAST CANCER SCREENING OTHER THAN MAMMOGRAM: ICD-10-CM

## 2024-11-11 DIAGNOSIS — Z91.89 AT HIGH RISK FOR BREAST CANCER: Primary | ICD-10-CM

## 2024-11-11 DIAGNOSIS — R92.30 DENSE BREAST TISSUE: ICD-10-CM

## 2024-11-11 DIAGNOSIS — Z80.3 FAMILY HISTORY OF BREAST CANCER: ICD-10-CM

## 2024-11-11 PROCEDURE — 3074F SYST BP LT 130 MM HG: CPT | Mod: CPTII,,, | Performed by: STUDENT IN AN ORGANIZED HEALTH CARE EDUCATION/TRAINING PROGRAM

## 2024-11-11 PROCEDURE — 1159F MED LIST DOCD IN RCRD: CPT | Mod: CPTII,,, | Performed by: STUDENT IN AN ORGANIZED HEALTH CARE EDUCATION/TRAINING PROGRAM

## 2024-11-11 PROCEDURE — 99999 PR PBB SHADOW E&M-EST. PATIENT-LVL IV: CPT | Mod: PBBFAC,,, | Performed by: STUDENT IN AN ORGANIZED HEALTH CARE EDUCATION/TRAINING PROGRAM

## 2024-11-11 PROCEDURE — 99214 OFFICE O/P EST MOD 30 MIN: CPT | Mod: PBBFAC | Performed by: STUDENT IN AN ORGANIZED HEALTH CARE EDUCATION/TRAINING PROGRAM

## 2024-11-11 PROCEDURE — 99213 OFFICE O/P EST LOW 20 MIN: CPT | Mod: S$PBB,,, | Performed by: STUDENT IN AN ORGANIZED HEALTH CARE EDUCATION/TRAINING PROGRAM

## 2024-11-11 PROCEDURE — 3078F DIAST BP <80 MM HG: CPT | Mod: CPTII,,, | Performed by: STUDENT IN AN ORGANIZED HEALTH CARE EDUCATION/TRAINING PROGRAM

## 2024-11-11 PROCEDURE — 1160F RVW MEDS BY RX/DR IN RCRD: CPT | Mod: CPTII,,, | Performed by: STUDENT IN AN ORGANIZED HEALTH CARE EDUCATION/TRAINING PROGRAM

## 2024-11-11 PROCEDURE — 3008F BODY MASS INDEX DOCD: CPT | Mod: CPTII,,, | Performed by: STUDENT IN AN ORGANIZED HEALTH CARE EDUCATION/TRAINING PROGRAM

## 2024-11-11 RX ORDER — OMALIZUMAB 150 MG/ML
INJECTION, SOLUTION SUBCUTANEOUS
COMMUNITY
Start: 2024-10-31

## 2024-11-11 NOTE — PROGRESS NOTES
[facility] - Breast [place] [facility]  Breast Surgical Oncology  Established Patient Office Visit - H&P      Care Team: Patient Care Team:  Yasmin Coreas MD as PCP - General (Family Medicine)   Referring Provider: No ref. provider found    OBGYN: Mine Perez NP    Chief Complaint:   Chief Complaint   Patient presents with    Follow-up     Patient reports no breast related concerns         Subjective:    Treatment/pertinent history:  Genetic testing 2023:  Negative for pathogenic mutation.  VUS PMS2.    Interval history:  Adriana Hernandez is a 34 y.o.female who is here today for high risk FU. She is doing well. She denies any breast changes including new masses, skin changes, skin lesions, pain, nipple discharge or retraction.  She had screening mammogram back in August which did not show any suspicious findings.  She was not able to do her MRI last year because of issues around scheduling and her cycle.     History of Present Illness:  Adriana Hernandez is a pleasant 34 y.o.female who presents as a referral from Mine Perez NP for high risk evaluation.  Family history is significant for mom with breast cancer at 42, maternal aunt with breast cancer in her 40s, maternal grandmother with breast cancer in her 40s who  of metastatic disease, and maternal grandfather with metastatic prostate cancer.  She denies any breast changes including new masses, skin changes, skin lesions, pain, nipple discharge or retraction.  She had a bilateral diagnostic mammogram in April for a physician palpated mass in her right breast.  There were no suspicious abnormalities seen.  She has never had any breast surgeries or biopsies in the past.  She does not smoke. 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 She has four children, 3 boys and 1 girl, and is interested in more children.      Imaging:   Bilateral diagnostic mammogram and ultrasound 2023:  Density D. no  suspicious findings.  And no abnormalities evident in general area of palpable concern in the R upper-outer quadrant.  BI-RADS 2.  Bilateral screening mammogram 08/22/2024: Density D. no suspicious findings.  BI-RADS 1.    I have reviewed the imaging and agree with the radiologists interpretation. I have discussed these results with the patient.    Pathology:  None      OB / GYN History   Menarche Onset:9  Menopause: Menopause: premenopausal  Hormonal birth control (duration): 2years  Pregnancies: 4  Age at first child birth: 15  Child births: 4  Hysterectomy/Oophorectomy: no  HRT: no    Family History of Cancer (& age at diagnosis):  -   Family History   Problem Relation Name Age of Onset    Breast cancer Mother  42    Diabetes Mother      Hypertension Mother      Breast cancer Maternal Grandmother  40 - 49    Hypertension Maternal Grandmother      Prostate cancer Maternal Grandfather  50 - 59    Breast cancer Maternal Aunt  42    Hypertension Maternal Aunt      Diabetes Maternal Cousin          Lifestyle:  Height and Weight:   BMI: Body mass index is 22.7 kg/m².     Other:  MG breast density: D  Prior thoracic RT: none  Genetic testing: No  Ashkenazi Judaism descent: No    Other History:     History reviewed. No pertinent past medical history.     History reviewed. No pertinent surgical history.     Social History     Socioeconomic History    Marital status:    Tobacco Use    Smoking status: Never    Smokeless tobacco: Never   Substance and Sexual Activity    Alcohol use: Not Currently     Comment: occasionally    Drug use: Never    Sexual activity: Yes     Partners: Male          There is no immunization history on file for this patient.     Medications/Allergies:       Current Outpatient Medications   Medication Instructions    albuterol (PROVENTIL/VENTOLIN HFA) 90 mcg/actuation inhaler As needed (PRN)    fluticasone propionate (FLOVENT HFA) 220 mcg/actuation inhaler As needed (PRN)     "HYDROcodone-acetaminophen (NORCO) 5-325 mg per tablet 1 tablet, Oral, Every 6 hours PRN    levocetirizine (XYZAL) 5 MG tablet 1 tablet in the evening Orally Once a day    loratadine (CLARITIN) 10 mg tablet As needed (PRN)    montelukast (SINGULAIR) 10 mg tablet As needed (PRN)    multivit-iron-FA-calcium-mins (WOMEN'S DAILY FORMULA) 18 mg iron-400 mcg-500 mg Ca Tab Womens Daily Formula    omeprazole (PRILOSEC) 40 MG capsule 1 capsule 30 minutes before morning meal Orally Once a day    XOLAIR 150 mg/mL injection Inject into the skin.       Review of patient's allergies indicates:   Allergen Reactions    Penicillins Hives    Sulfa (sulfonamide antibiotics) Hives        Review of Systems:      ROS  See HPI for pertinent ROS.       Objective/Physical Exam   Visit Vitals  /75 (BP Location: Left arm, Patient Position: Sitting)   Pulse 71   Temp 98.2 °F (36.8 °C) (Oral)   Resp 18   Ht 5' 5" (1.651 m)   Wt 61.9 kg (136 lb 6.4 oz)   LMP 10/10/2024   SpO2 100%   BMI 22.70 kg/m²          Physical Exam    General: The patient is awake, alert and oriented. The patient is well nourished. No acute distress.  Neck: The neck is supple. The thyroid is not enlarged.  Musculoskeletal: The patient has a normal range of motion of her bilateral upper extremities.  Heart: Regular rate and rhythm, no murmurs.   Lung: No increased work of breathing. Clear breath sounds bilaterally.  Lymph nodes: There is no axillary, supraclavicular or cervical lymphadenopathy.  Breast:  Right:   On inspection there is no skin dimpling, rashes, retraction, erythema or skin abnormalities. The nipple areolar complex is normal with an everted nipple. The breasts move normally with movement of the pectoralis muscle. On palpation there are no dominant masses. There is no tenderness. There is no nipple discharge.  Left:   On inspection there is no skin dimpling, rashes, retraction, erythema or skin abnormalities. The nipple areolar complex is normal with an " everted nipple. The breasts move normally with movement of the pectoralis muscle. On palpation there are no dominant masses.  There is no tenderness. There is no nipple discharge.     Assessment and Plan     1. At high risk for breast cancer    2. Family history of breast cancer    3. Encounter for breast cancer screening other than mammogram    4. Dense breast tissue      Adriana Hernandez is a pleasant 34 y.o.female who presents today for high risk follow up.  No suspicious findings on breast exam today or on her screening mammogram.  Her calculated risk of breast cancer based on Tyrer - Cuzick Breast Cancer Risk Model  was 35%.  [place] 5 year risk is 0.5%.  She understands that >20% is considered high risk.  Today we discussed risk factors associated with the development of breast cancer and risk reduction strategies.      --------------------------------------------------------------------------------------------------------------    PLAN:    1. Lifestyle - Healthy lifestyle guidelines were reviewed. She was encouraged to engage in regular exercise, maintain a healthy body weight, and avoid excessive alcohol consumption. Healthy nutritional guidelines were also discussed.     2. Surveillance - She desires undergoing high risk screening with annual screening mammograms and breast MRIs. In the absence of significant clinical findings in the interval, I recommend MRI now and bilateral screening mammogram due in August 2025.    3. Prevention -  These methods are not recommended to her at this time.    4. Recommend 2 clinical breast exams a year and this can alternate between the breast Center and her OBGYN.    We also recommend and discussed performing a self breast exam monthly.  If she notices any changes in her breast exam in between visits she was encouraged to call the breast center to be seen as soon as possible. All questions were answered.         Sheryl Shelton MD  Breast Surgical Oncology       I spent a  total of 20 minutes on the day of the visit.  This includes face to face time and non-face to face time preparing to see the patient (eg, review of tests), obtaining and/or reviewing separately obtained history, documenting clinical information in the electronic or other health record, independently interpreting results and communicating results to the patient/family/caregiver, or care coordinator.

## 2024-11-12 DIAGNOSIS — Z91.89 AT HIGH RISK FOR BREAST CANCER: Primary | ICD-10-CM

## 2024-11-12 RX ORDER — ALPRAZOLAM 0.5 MG/1
0.5 TABLET ORAL
Qty: 2 TABLET | Refills: 0 | Status: SHIPPED | OUTPATIENT
Start: 2024-11-12

## 2025-02-02 ENCOUNTER — HOSPITAL ENCOUNTER (EMERGENCY)
Facility: HOSPITAL | Age: 35
Discharge: HOME | End: 2025-02-03
Attending: EMERGENCY MEDICINE
Payer: MEDICAID

## 2025-02-02 VITALS
HEIGHT: 64 IN | WEIGHT: 136 LBS | OXYGEN SATURATION: 99 % | DIASTOLIC BLOOD PRESSURE: 95 MMHG | RESPIRATION RATE: 18 BRPM | BODY MASS INDEX: 23.22 KG/M2 | HEART RATE: 89 BPM | TEMPERATURE: 99 F | SYSTOLIC BLOOD PRESSURE: 146 MMHG

## 2025-02-02 DIAGNOSIS — N75.1 BARTHOLIN'S GLAND ABSCESS: Primary | ICD-10-CM

## 2025-02-02 PROCEDURE — 99284 EMERGENCY DEPT VISIT MOD MDM: CPT

## 2025-02-03 PROCEDURE — 56420 I&D BARTHOLINS GLAND ABSCESS: CPT

## 2025-02-03 RX ORDER — CLINDAMYCIN HYDROCHLORIDE 300 MG/1
300 CAPSULE ORAL EVERY 8 HOURS
Qty: 21 CAPSULE | Refills: 0 | Status: SHIPPED | OUTPATIENT
Start: 2025-02-03 | End: 2025-02-10

## 2025-02-03 RX ORDER — IBUPROFEN 800 MG/1
800 TABLET ORAL 3 TIMES DAILY PRN
Qty: 30 TABLET | Refills: 0 | Status: SHIPPED | OUTPATIENT
Start: 2025-02-03 | End: 2025-02-13

## 2025-02-03 NOTE — ED PROVIDER NOTES
Encounter Date: 2/2/2025       History     Chief Complaint   Patient presents with    Abscess     C/o abscess to the left groin area, reports hx of abscess in the same area last year that required I&D she madai any fevers      See MDM    The history is provided by the patient. No  was used.     Review of patient's allergies indicates:   Allergen Reactions    Penicillins Hives    Sulfa (sulfonamide antibiotics) Hives     History reviewed. No pertinent past medical history.  History reviewed. No pertinent surgical history.  Family History   Problem Relation Name Age of Onset    Breast cancer Mother  42    Diabetes Mother      Hypertension Mother      Breast cancer Maternal Grandmother  40 - 49    Hypertension Maternal Grandmother      Prostate cancer Maternal Grandfather  50 - 59    Breast cancer Maternal Aunt  42    Hypertension Maternal Aunt      Diabetes Maternal Cousin       Social History     Tobacco Use    Smoking status: Never    Smokeless tobacco: Never   Substance Use Topics    Alcohol use: Not Currently     Comment: occasionally    Drug use: Never     Review of Systems   Skin:  Positive for wound (left labial abscess).   All other systems reviewed and are negative.      Physical Exam     Initial Vitals [02/02/25 2207]   BP Pulse Resp Temp SpO2   (!) 146/95 89 18 98.8 °F (37.1 °C) 99 %      MAP       --         Physical Exam    Nursing note and vitals reviewed.  Constitutional: She appears well-developed and well-nourished.   Cardiovascular:  Normal rate.           Pulmonary/Chest: No respiratory distress.     Neurological: She is alert and oriented to person, place, and time.   Skin: Abscess (left bartholin's cyst abscess) noted.   Psychiatric: She has a normal mood and affect.         ED Course   I & D - Incision and Drainage    Date/Time: 2/3/2025 12:50 PM  Location procedure was performed: [facility] EMERGENCY DEPARTMENT    Performed by: Mili Campoverde FNP  Authorized by: Laurence Aly  MD ROSSY  Consent Done: Yes  Consent: Verbal consent obtained.  Risks and benefits: risks, benefits and alternatives were discussed  Consent given by: patient  Patient understanding: patient states understanding of the procedure being performed  Patient identity confirmed: name  Type: abscess  Body area: anogenital  Location details: Bartholin's gland  Anesthesia: local infiltration    Anesthesia:  Local Anesthetic: lidocaine 1% without epinephrine  Anesthetic total: 8 mL    Patient sedated: no  Scalpel size: 11  Incision type: single straight  Incision depth: submucosal  Complexity: simple  Drainage: pus  Drainage amount: copious  Wound treatment: incision and drainage  Complications: No  Specimens: No  Implants: No  Patient tolerance: Patient tolerated the procedure well with no immediate complications    Incision depth: submucosal        Labs Reviewed - No data to display       Imaging Results    None          Medications - No data to display  Medical Decision Making  35-year-old female presents with left labial vaginal swelling and pain since Thursday that is progressively worsening.  She states that she had something like this in the past which she had to have drained.  No fevers.    I and D performed, see procedure note.  We will place on antibiotics    Risk  Prescription drug management.      Additional MDM:   Differential Diagnosis:   Other: The following diagnoses were also considered and will be evaluated: Bartholin's gland cyst, Herpes and Cellulitis.                                   Clinical Impression:  Final diagnoses:  [N75.1] Bartholin's gland abscess (Primary)          ED Disposition Condition    Discharge Stable          ED Prescriptions       Medication Sig Dispense Start Date End Date Auth. Provider    clindamycin (CLEOCIN) 300 MG capsule Take 1 capsule (300 mg total) by mouth every 8 (eight) hours. for 7 days 21 capsule 2/3/2025 2/10/2025 Mili Campoverde FNP    ibuprofen (ADVIL,MOTRIN) 800 MG  tablet Take 1 tablet (800 mg total) by mouth 3 (three) times daily as needed. 30 tablet 2/3/2025 2/13/2025 Mili Campoverde FNP          Follow-up Information       Follow up With Specialties Details Why Contact Info    Yasmin Coreas MD Family Medicine   [address] [place] [id number]  [phone]      Mine Perez  Call in 1 week               Mili Campoverde FNP  02/03/25 0117

## 2025-09-03 DIAGNOSIS — Z91.89 AT HIGH RISK FOR BREAST CANCER: Primary | ICD-10-CM

## 2025-09-03 DIAGNOSIS — Z80.3 FAMILY HISTORY OF BREAST CANCER: ICD-10-CM

## 2025-09-03 DIAGNOSIS — Z12.39 ENCOUNTER FOR BREAST CANCER SCREENING OTHER THAN MAMMOGRAM: ICD-10-CM

## 2025-09-03 DIAGNOSIS — R92.30 DENSE BREAST TISSUE: ICD-10-CM
